# Patient Record
Sex: MALE | Race: WHITE | HISPANIC OR LATINO | URBAN - METROPOLITAN AREA
[De-identification: names, ages, dates, MRNs, and addresses within clinical notes are randomized per-mention and may not be internally consistent; named-entity substitution may affect disease eponyms.]

---

## 2018-06-04 ENCOUNTER — EMERGENCY (EMERGENCY)
Facility: HOSPITAL | Age: 43
LOS: 1 days | Discharge: HOME | End: 2018-06-04
Admitting: EMERGENCY MEDICINE
Payer: COMMERCIAL

## 2018-06-04 VITALS
HEART RATE: 75 BPM | OXYGEN SATURATION: 100 % | DIASTOLIC BLOOD PRESSURE: 97 MMHG | TEMPERATURE: 98 F | SYSTOLIC BLOOD PRESSURE: 146 MMHG | RESPIRATION RATE: 18 BRPM

## 2018-06-04 PROCEDURE — 29125 APPL SHORT ARM SPLINT STATIC: CPT | Mod: LT

## 2018-06-04 PROCEDURE — 73130 X-RAY EXAM OF HAND: CPT | Mod: 26,LT

## 2018-06-04 PROCEDURE — 73090 X-RAY EXAM OF FOREARM: CPT | Mod: 26,LT

## 2018-06-04 PROCEDURE — 99284 EMERGENCY DEPT VISIT MOD MDM: CPT | Mod: 25

## 2018-06-04 PROCEDURE — 73110 X-RAY EXAM OF WRIST: CPT | Mod: 26,LT

## 2018-06-04 RX ORDER — IBUPROFEN 200 MG
600 TABLET ORAL ONCE
Qty: 0 | Refills: 0 | Status: COMPLETED | OUTPATIENT
Start: 2018-06-04 | End: 2018-06-04

## 2018-06-04 RX ADMIN — Medication 600 MILLIGRAM(S): at 20:56

## 2018-06-04 NOTE — ED PROVIDER NOTE - PLAN OF CARE
Follow up with your primary physician for a post hospital visit within 48 hours, taking all results from the ER to be reviewed.  For pain control take Motrin 600mg 1 tab every 8 hours if needed.  You can apply a heating pad to the lower back as directed and ice the arm as directed.  If any weakness to the extremities, sensation changes , any worsening, concerning or new signs or symptoms return to the ER. Orthopedist referral list  given if no change. Follow up with your primary physician for a post hospital visit within 48 hours, taking all results from the ER to be reviewed.  For pain control take Motrin 600mg 1 tab every 8 hours if needed.  You can apply a heating pad to the lower back as directed and ice the arm as directed.  If any weakness to the extremities, sensation changes , any worsening, concerning or new signs or symptoms return to the ER. Keep the splint on and dry until you follow up with either hand specialist or Orthopedist this week, referral list  given
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
